# Patient Record
Sex: MALE | Race: WHITE | URBAN - METROPOLITAN AREA
[De-identification: names, ages, dates, MRNs, and addresses within clinical notes are randomized per-mention and may not be internally consistent; named-entity substitution may affect disease eponyms.]

---

## 2021-06-03 ENCOUNTER — TELEPHONE (OUTPATIENT)
Dept: GASTROENTEROLOGY | Facility: CLINIC | Age: 66
End: 2021-06-03

## 2021-06-03 NOTE — TELEPHONE ENCOUNTER
Recall call went out via [employer] 3/2020 with no return calls from pt to schedule  Pt is due for a f/u appt with Dr Cornell San for cirrhosis  I lmom for pt to please call back to schedule an appt with Dr Cornell San   Will call pt again in one week if do not hear back from him

## 2021-06-10 NOTE — TELEPHONE ENCOUNTER
I lmom for pt to please call back to schedule an appt with Dr Courtney Arrington   Will call pt again in two weeks if do not hear back from him